# Patient Record
Sex: FEMALE | Race: BLACK OR AFRICAN AMERICAN | NOT HISPANIC OR LATINO | Employment: UNEMPLOYED | URBAN - METROPOLITAN AREA
[De-identification: names, ages, dates, MRNs, and addresses within clinical notes are randomized per-mention and may not be internally consistent; named-entity substitution may affect disease eponyms.]

---

## 2021-03-23 ENCOUNTER — HISTORICAL (OUTPATIENT)
Dept: ADMINISTRATIVE | Facility: HOSPITAL | Age: 51
End: 2021-03-23

## 2022-04-10 ENCOUNTER — HISTORICAL (OUTPATIENT)
Dept: ADMINISTRATIVE | Facility: HOSPITAL | Age: 52
End: 2022-04-10

## 2022-04-26 VITALS
WEIGHT: 259.94 LBS | HEIGHT: 63 IN | DIASTOLIC BLOOD PRESSURE: 85 MMHG | SYSTOLIC BLOOD PRESSURE: 126 MMHG | BODY MASS INDEX: 46.06 KG/M2

## 2022-11-08 ENCOUNTER — TELEPHONE (OUTPATIENT)
Dept: ORTHOPEDICS | Facility: CLINIC | Age: 52
End: 2022-11-08

## 2022-11-17 ENCOUNTER — OFFICE VISIT (OUTPATIENT)
Dept: ORTHOPEDICS | Facility: CLINIC | Age: 52
End: 2022-11-17
Payer: MEDICARE

## 2022-11-17 ENCOUNTER — HOSPITAL ENCOUNTER (OUTPATIENT)
Dept: RADIOLOGY | Facility: CLINIC | Age: 52
Discharge: HOME | End: 2022-11-17
Attending: ORTHOPAEDIC SURGERY
Payer: MEDICARE

## 2022-11-17 VITALS
HEIGHT: 62 IN | BODY MASS INDEX: 47.66 KG/M2 | SYSTOLIC BLOOD PRESSURE: 158 MMHG | HEART RATE: 79 BPM | DIASTOLIC BLOOD PRESSURE: 92 MMHG | WEIGHT: 259 LBS

## 2022-11-17 DIAGNOSIS — M17.11 PRIMARY OSTEOARTHRITIS OF RIGHT KNEE: Primary | ICD-10-CM

## 2022-11-17 DIAGNOSIS — E66.01 MORBID OBESITY: ICD-10-CM

## 2022-11-17 DIAGNOSIS — M25.561 ACUTE PAIN OF RIGHT KNEE: ICD-10-CM

## 2022-11-17 PROCEDURE — 3080F DIAST BP >= 90 MM HG: CPT | Mod: CPTII,,, | Performed by: ORTHOPAEDIC SURGERY

## 2022-11-17 PROCEDURE — 1160F PR REVIEW ALL MEDS BY PRESCRIBER/CLIN PHARMACIST DOCUMENTED: ICD-10-PCS | Mod: CPTII,,, | Performed by: ORTHOPAEDIC SURGERY

## 2022-11-17 PROCEDURE — 99214 PR OFFICE/OUTPT VISIT, EST, LEVL IV, 30-39 MIN: ICD-10-PCS | Mod: 25,,, | Performed by: ORTHOPAEDIC SURGERY

## 2022-11-17 PROCEDURE — 1159F PR MEDICATION LIST DOCUMENTED IN MEDICAL RECORD: ICD-10-PCS | Mod: CPTII,,, | Performed by: ORTHOPAEDIC SURGERY

## 2022-11-17 PROCEDURE — 20610 LARGE JOINT ASPIRATION/INJECTION: R KNEE: ICD-10-PCS | Mod: RT,,, | Performed by: ORTHOPAEDIC SURGERY

## 2022-11-17 PROCEDURE — 3008F PR BODY MASS INDEX (BMI) DOCUMENTED: ICD-10-PCS | Mod: CPTII,,, | Performed by: ORTHOPAEDIC SURGERY

## 2022-11-17 PROCEDURE — 1159F MED LIST DOCD IN RCRD: CPT | Mod: CPTII,,, | Performed by: ORTHOPAEDIC SURGERY

## 2022-11-17 PROCEDURE — 20610 DRAIN/INJ JOINT/BURSA W/O US: CPT | Mod: RT,,, | Performed by: ORTHOPAEDIC SURGERY

## 2022-11-17 PROCEDURE — 3080F PR MOST RECENT DIASTOLIC BLOOD PRESSURE >= 90 MM HG: ICD-10-PCS | Mod: CPTII,,, | Performed by: ORTHOPAEDIC SURGERY

## 2022-11-17 PROCEDURE — 3008F BODY MASS INDEX DOCD: CPT | Mod: CPTII,,, | Performed by: ORTHOPAEDIC SURGERY

## 2022-11-17 PROCEDURE — 73564 X-RAY EXAM KNEE 4 OR MORE: CPT | Mod: RT,,, | Performed by: ORTHOPAEDIC SURGERY

## 2022-11-17 PROCEDURE — 73564 XR KNEE COMP 4 OR MORE VIEWS RIGHT: ICD-10-PCS | Mod: RT,,, | Performed by: ORTHOPAEDIC SURGERY

## 2022-11-17 PROCEDURE — 3077F PR MOST RECENT SYSTOLIC BLOOD PRESSURE >= 140 MM HG: ICD-10-PCS | Mod: CPTII,,, | Performed by: ORTHOPAEDIC SURGERY

## 2022-11-17 PROCEDURE — 99214 OFFICE O/P EST MOD 30 MIN: CPT | Mod: 25,,, | Performed by: ORTHOPAEDIC SURGERY

## 2022-11-17 PROCEDURE — 3077F SYST BP >= 140 MM HG: CPT | Mod: CPTII,,, | Performed by: ORTHOPAEDIC SURGERY

## 2022-11-17 PROCEDURE — 1160F RVW MEDS BY RX/DR IN RCRD: CPT | Mod: CPTII,,, | Performed by: ORTHOPAEDIC SURGERY

## 2022-11-17 RX ORDER — QUETIAPINE FUMARATE 200 MG/1
TABLET, FILM COATED ORAL
COMMUNITY
Start: 2022-08-08

## 2022-11-17 RX ORDER — LINACLOTIDE 145 UG/1
145 CAPSULE, GELATIN COATED ORAL EVERY MORNING
COMMUNITY
Start: 2022-10-23

## 2022-11-17 RX ORDER — BENZTROPINE MESYLATE 1 MG/1
1 TABLET ORAL 2 TIMES DAILY
COMMUNITY
Start: 2022-10-17

## 2022-11-17 RX ORDER — LIDOCAINE HYDROCHLORIDE 20 MG/ML
2 INJECTION, SOLUTION INFILTRATION; PERINEURAL
Status: DISCONTINUED | OUTPATIENT
Start: 2022-11-17 | End: 2022-11-17 | Stop reason: HOSPADM

## 2022-11-17 RX ORDER — FUROSEMIDE 40 MG/1
40 TABLET ORAL
COMMUNITY

## 2022-11-17 RX ORDER — HYDROXYZINE HYDROCHLORIDE 50 MG/1
50 TABLET, FILM COATED ORAL
COMMUNITY
Start: 2022-09-07

## 2022-11-17 RX ORDER — IBUPROFEN 600 MG/1
600 TABLET ORAL EVERY 6 HOURS PRN
COMMUNITY
Start: 2022-05-31 | End: 2024-03-12

## 2022-11-17 RX ORDER — ACETAMINOPHEN AND CODEINE PHOSPHATE 300; 30 MG/1; MG/1
1 TABLET ORAL EVERY 6 HOURS PRN
COMMUNITY
Start: 2022-05-31 | End: 2024-03-12

## 2022-11-17 RX ORDER — DULOXETIN HYDROCHLORIDE 60 MG/1
60 CAPSULE, DELAYED RELEASE ORAL
COMMUNITY
End: 2024-03-11

## 2022-11-17 RX ORDER — ACETAMINOPHEN 500 MG
2000 TABLET ORAL DAILY
COMMUNITY
Start: 2022-08-18

## 2022-11-17 RX ORDER — POTASSIUM CHLORIDE 750 MG/1
10 TABLET, EXTENDED RELEASE ORAL
COMMUNITY
End: 2024-03-12

## 2022-11-17 RX ORDER — SERTRALINE HYDROCHLORIDE 100 MG/1
100 TABLET, FILM COATED ORAL
COMMUNITY
Start: 2022-09-07

## 2022-11-17 RX ORDER — BETAMETHASONE SODIUM PHOSPHATE AND BETAMETHASONE ACETATE 3; 3 MG/ML; MG/ML
6 INJECTION, SUSPENSION INTRA-ARTICULAR; INTRALESIONAL; INTRAMUSCULAR; SOFT TISSUE
Status: DISCONTINUED | OUTPATIENT
Start: 2022-11-17 | End: 2022-11-17 | Stop reason: HOSPADM

## 2022-11-17 RX ORDER — FLUTICASONE PROPIONATE 50 MCG
SPRAY, SUSPENSION (ML) NASAL
COMMUNITY
Start: 2022-09-07

## 2022-11-17 RX ORDER — TORSEMIDE 20 MG/1
20 TABLET ORAL
COMMUNITY
Start: 2022-09-07

## 2022-11-17 RX ORDER — SOD SULF/POT CHLORIDE/MAG SULF 1.479 G
TABLET ORAL
COMMUNITY
Start: 2022-09-25

## 2022-11-17 RX ORDER — OMEPRAZOLE 20 MG/1
20 CAPSULE, DELAYED RELEASE ORAL
COMMUNITY
End: 2024-03-12

## 2022-11-17 RX ADMIN — LIDOCAINE HYDROCHLORIDE 2 MG: 20 INJECTION, SOLUTION INFILTRATION; PERINEURAL at 02:11

## 2022-11-17 RX ADMIN — BETAMETHASONE SODIUM PHOSPHATE AND BETAMETHASONE ACETATE 6 MG: 3; 3 INJECTION, SUSPENSION INTRA-ARTICULAR; INTRALESIONAL; INTRAMUSCULAR; SOFT TISSUE at 02:11

## 2022-11-17 NOTE — PROGRESS NOTES
History of present illness:    This is a 52 y.o. year old female that present today with knee pain. Patient has had this knee pain for several months. This knee pain is moderate to severe. Patient states pain is worse with squats and lunges. Patient states pain is global.  She is been treated previously with right knee steroid injections.  She states that they were helpful.  She is currently taking ibuprofen and it is helpful as well.    History reviewed. No pertinent past medical history.    Past Surgical History:   Procedure Laterality Date     SECTION      TUBAL LIGATION         Current Outpatient Medications   Medication Sig    acetaminophen-codeine 300-30mg (TYLENOL #3) 300-30 mg Tab Take 1 tablet by mouth every 6 (six) hours as needed.    benztropine (COGENTIN) 1 MG tablet Take 1 mg by mouth 2 (two) times daily.    cholecalciferol, vitamin D3, (VITAMIN D3) 50 mcg (2,000 unit) Cap capsule Take 2,000 Units by mouth once daily.    DULoxetine (CYMBALTA) 60 MG capsule Take 60 mg by mouth.    fluticasone propionate (FLONASE) 50 mcg/actuation nasal spray by Nasal route.    furosemide (LASIX) 40 MG tablet Take 40 mg by mouth.    hydrOXYzine (ATARAX) 50 MG tablet Take 50 mg by mouth.    ibuprofen (ADVIL,MOTRIN) 600 MG tablet Take 600 mg by mouth every 6 (six) hours as needed.    LINZESS 145 mcg Cap capsule Take 145 mcg by mouth every morning.    omeprazole (PRILOSEC) 20 MG capsule Take 20 mg by mouth.    potassium chloride (KLOR-CON) 10 MEQ TbSR Take 10 mEq by mouth.    QUEtiapine (SEROQUEL) 200 MG Tab     sertraline (ZOLOFT) 100 MG tablet Take 100 mg by mouth.    torsemide (DEMADEX) 20 MG Tab Take 20 mg by mouth.    SUTAB 1.479-0.188- 0.225 gram tablet Take by mouth.     No current facility-administered medications for this visit.       Review of patient's allergies indicates:  Not on File    Family History   Problem Relation Age of Onset    No Known Problems Mother     No Known Problems Father     No Known  "Problems Sister        Social History     Socioeconomic History    Marital status: Single   Tobacco Use    Smoking status: Never    Smokeless tobacco: Never   Substance and Sexual Activity    Alcohol use: Yes     Comment: occasion    Drug use: Never    Sexual activity: Not Currently       Chief Complaint:   Chief Complaint   Patient presents with    Right Knee - Pain     Right knee pain. interested in synvisc. No piror injury or sx. intrested in an injection. Previously had cortisone injection and had relief.        Consulting Physician: No ref. provider found      Review of Systems:    All review of systems negative except for those stated in the HPI    Examination:    Vital Signs:    Vitals:    11/17/22 1542   BP: (!) 158/92   Pulse: 79   Weight: 117.5 kg (259 lb)   Height: 5' 2" (1.575 m)   PainSc:   7       Body mass index is 47.37 kg/m².    Physical Exam:   General: Well-developed, well-nourished.  Neuro: Alert and oriented x 3.  Psych: Normal mood and affect.    Right Knee Exam:  Varus deformity. Range of motion from 5-110 degrees. Negative patella grind and equal subluxation of knee cap medial and lateral < 1cm. Negative patella tendon tenderness. Negative Lachman and anterior drawer test. Negative posterior drawer test. Negative varus and valgus stress test. Positive medial joint line tenderness. Negative lateral joint line tenderness. 4/5 strength and normal skin appearance. Sensibility normal.      Imaging: X-rays ordered and images interpreted today personally by me of X-rays demonstrate views of the knee joint space narrowing and degenerative changes with a varus deformity. There is also subchondral sclerosis and osteophyte formation which is equivalent to a Kellgren Dean grade 4.         Assessment: Primary osteoarthritis of right knee  -     Large Joint Aspiration/Injection: R knee  -     Prior authorization Order    Acute pain of right knee  -     X-Ray Knee Complete 4 Or More Views Right; Future; " Expected date: 11/17/2022    Morbid obesity  -     Ambulatory referral/consult to Bariatric Surgery; Future; Expected date: 11/24/2022      Plan:    We had a long discussion about options.  I am going to do a steroid injection here today and bring her back in 2 weeks for a Synvisc injection.  I will also make a referral to a weight loss specialist.  I do think she will need knee replacement surgery in her near future.      Large Joint Aspiration/Injection: R knee    Date/Time: 11/17/2022 2:15 PM  Performed by: Jesus Corona MD  Authorized by: Jesus Corona MD     Consent Done?:  Yes (Verbal)  Indications:  Arthritis and pain  Timeout: prior to procedure the correct patient, procedure, and site was verified    Prep: patient was prepped and draped in usual sterile fashion      Details:  Needle Size:  25 G  Approach:  Anterolateral  Location:  Knee  Site:  R knee  Medications:  2 mg LIDOcaine HCL 20 mg/ml (2%) 20 mg/mL (2 %); 6 mg betamethasone acetate-betamethasone sodium phosphate 6 mg/mL  Patient tolerance:  Patient tolerated the procedure well with no immediate complications     DISCLAIMER: This note may have been dictated using voice recognition software and may contain grammatical errors.     NOTE: Consult report sent to referring provider via [employer] EMR.

## 2022-11-17 NOTE — PROCEDURES
Large Joint Aspiration/Injection: R knee    Date/Time: 11/17/2022 2:15 PM  Performed by: Jesus Corona MD  Authorized by: Jesus Corona MD     Consent Done?:  Yes (Verbal)  Indications:  Arthritis and pain  Timeout: prior to procedure the correct patient, procedure, and site was verified    Prep: patient was prepped and draped in usual sterile fashion      Details:  Needle Size:  25 G  Approach:  Anterolateral  Location:  Knee  Site:  R knee  Medications:  2 mg LIDOcaine HCL 20 mg/ml (2%) 20 mg/mL (2 %); 6 mg betamethasone acetate-betamethasone sodium phosphate 6 mg/mL  Patient tolerance:  Patient tolerated the procedure well with no immediate complications

## 2022-12-30 DIAGNOSIS — M25.561 ACUTE PAIN OF RIGHT KNEE: Primary | ICD-10-CM

## 2022-12-30 DIAGNOSIS — M17.11 PRIMARY OSTEOARTHRITIS OF RIGHT KNEE: ICD-10-CM

## 2023-01-17 ENCOUNTER — OFFICE VISIT (OUTPATIENT)
Dept: ORTHOPEDICS | Facility: CLINIC | Age: 53
End: 2023-01-17
Payer: MEDICARE

## 2023-01-17 VITALS — HEIGHT: 62 IN | WEIGHT: 259.06 LBS | BODY MASS INDEX: 47.67 KG/M2

## 2023-01-17 DIAGNOSIS — M17.11 PRIMARY OSTEOARTHRITIS OF RIGHT KNEE: Primary | ICD-10-CM

## 2023-01-17 PROCEDURE — 20610 DRAIN/INJ JOINT/BURSA W/O US: CPT | Mod: RT,,, | Performed by: ORTHOPAEDIC SURGERY

## 2023-01-17 PROCEDURE — 99214 PR OFFICE/OUTPT VISIT, EST, LEVL IV, 30-39 MIN: ICD-10-PCS | Mod: 25,,, | Performed by: ORTHOPAEDIC SURGERY

## 2023-01-17 PROCEDURE — 1160F PR REVIEW ALL MEDS BY PRESCRIBER/CLIN PHARMACIST DOCUMENTED: ICD-10-PCS | Mod: CPTII,,, | Performed by: ORTHOPAEDIC SURGERY

## 2023-01-17 PROCEDURE — 3008F PR BODY MASS INDEX (BMI) DOCUMENTED: ICD-10-PCS | Mod: CPTII,,, | Performed by: ORTHOPAEDIC SURGERY

## 2023-01-17 PROCEDURE — 1160F RVW MEDS BY RX/DR IN RCRD: CPT | Mod: CPTII,,, | Performed by: ORTHOPAEDIC SURGERY

## 2023-01-17 PROCEDURE — 20610 LARGE JOINT ASPIRATION/INJECTION: R KNEE: ICD-10-PCS | Mod: RT,,, | Performed by: ORTHOPAEDIC SURGERY

## 2023-01-17 PROCEDURE — 3008F BODY MASS INDEX DOCD: CPT | Mod: CPTII,,, | Performed by: ORTHOPAEDIC SURGERY

## 2023-01-17 PROCEDURE — 99214 OFFICE O/P EST MOD 30 MIN: CPT | Mod: 25,,, | Performed by: ORTHOPAEDIC SURGERY

## 2023-01-17 PROCEDURE — 1159F MED LIST DOCD IN RCRD: CPT | Mod: CPTII,,, | Performed by: ORTHOPAEDIC SURGERY

## 2023-01-17 PROCEDURE — 1159F PR MEDICATION LIST DOCUMENTED IN MEDICAL RECORD: ICD-10-PCS | Mod: CPTII,,, | Performed by: ORTHOPAEDIC SURGERY

## 2023-01-17 RX ORDER — LIDOCAINE HYDROCHLORIDE 20 MG/ML
2 INJECTION, SOLUTION INFILTRATION; PERINEURAL
Status: DISCONTINUED | OUTPATIENT
Start: 2023-01-17 | End: 2023-01-17 | Stop reason: HOSPADM

## 2023-01-17 RX ADMIN — LIDOCAINE HYDROCHLORIDE 2 MG: 20 INJECTION, SOLUTION INFILTRATION; PERINEURAL at 01:01

## 2023-01-17 NOTE — PROCEDURES
Large Joint Aspiration/Injection: R knee    Date/Time: 1/17/2023 1:30 PM  Performed by: Jesus Corona MD  Authorized by: Jesus Corona MD     Consent Done?:  Yes (Verbal)  Indications:  Pain and arthritis  Site marked: the procedure site was marked    Timeout: prior to procedure the correct patient, procedure, and site was verified    Prep: patient was prepped and draped in usual sterile fashion      Local anesthesia used?: Yes    Local anesthetic:  Topical anesthetic and lidocaine 2% without epinephrine    Details:  Needle Size:  18 G  Ultrasonic Guidance for needle placement?: No    Approach:  Superior (superolateral)  Location:  Knee  Site:  R knee  Medications:  48 mg hylan g-f 20 48 mg/6 mL; 2 mg LIDOcaine HCL 20 mg/ml (2%) 20 mg/mL (2 %)  Patient tolerance:  Patient tolerated the procedure well with no immediate complications

## 2023-01-17 NOTE — PROGRESS NOTES
History of present illness:    This is a 52 y.o. year old female that present today with knee pain. Patient has had this knee pain for several months. This knee pain is moderate to severe. Patient states pain is worse with squats and lunges. Patient states pain is global.  She is been treated previously with right knee steroid injections.  She states that they were helpful.  She is currently taking ibuprofen and it is helpful as well.  2023 this patient comes in today for a viscosupplementation injection.    History reviewed. No pertinent past medical history.    Past Surgical History:   Procedure Laterality Date     SECTION      TUBAL LIGATION         Current Outpatient Medications   Medication Sig    acetaminophen-codeine 300-30mg (TYLENOL #3) 300-30 mg Tab Take 1 tablet by mouth every 6 (six) hours as needed.    benztropine (COGENTIN) 1 MG tablet Take 1 mg by mouth 2 (two) times daily.    cholecalciferol, vitamin D3, (VITAMIN D3) 50 mcg (2,000 unit) Cap capsule Take 2,000 Units by mouth once daily.    DULoxetine (CYMBALTA) 60 MG capsule Take 60 mg by mouth.    fluticasone propionate (FLONASE) 50 mcg/actuation nasal spray by Nasal route.    furosemide (LASIX) 40 MG tablet Take 40 mg by mouth.    hydrOXYzine (ATARAX) 50 MG tablet Take 50 mg by mouth.    ibuprofen (ADVIL,MOTRIN) 600 MG tablet Take 600 mg by mouth every 6 (six) hours as needed.    LINZESS 145 mcg Cap capsule Take 145 mcg by mouth every morning.    omeprazole (PRILOSEC) 20 MG capsule Take 20 mg by mouth.    potassium chloride (KLOR-CON) 10 MEQ TbSR Take 10 mEq by mouth.    QUEtiapine (SEROQUEL) 200 MG Tab     sertraline (ZOLOFT) 100 MG tablet Take 100 mg by mouth.    SUTAB 1.479-0.188- 0.225 gram tablet Take by mouth.    torsemide (DEMADEX) 20 MG Tab Take 20 mg by mouth.     No current facility-administered medications for this visit.       Review of patient's allergies indicates:  No Known Allergies    Family History   Problem Relation  "Age of Onset    No Known Problems Mother     No Known Problems Father     No Known Problems Sister        Social History     Socioeconomic History    Marital status: Single   Tobacco Use    Smoking status: Never    Smokeless tobacco: Never   Substance and Sexual Activity    Alcohol use: Yes     Comment: occasion    Drug use: Never    Sexual activity: Not Currently       Chief Complaint:   Chief Complaint   Patient presents with    Right Knee - Pain    Pain     Right knee Synvisc    mn       Consulting Physician: Jesus Corona MD      Review of Systems:    All review of systems negative except for those stated in the HPI    Examination:    Vital Signs:    Vitals:    01/17/23 1356   Weight: 117.5 kg (259 lb 0.7 oz)   Height: 5' 2.01" (1.575 m)       Body mass index is 47.37 kg/m².    Physical Exam:   General: Well-developed, well-nourished.  Neuro: Alert and oriented x 3.  Psych: Normal mood and affect.    Right Knee Exam:  Varus deformity. Range of motion from 5-110 degrees. Negative patella grind and equal subluxation of knee cap medial and lateral < 1cm. Negative patella tendon tenderness. Negative Lachman and anterior drawer test. Negative posterior drawer test. Negative varus and valgus stress test. Positive medial joint line tenderness. Negative lateral joint line tenderness. 4/5 strength and normal skin appearance. Sensibility normal.      Imaging: X-rays ordered and images interpreted today personally by me of X-rays demonstrate views of the knee joint space narrowing and degenerative changes with a varus deformity. There is also subchondral sclerosis and osteophyte formation which is equivalent to a Kellgren Dean grade 4.         Assessment: Primary osteoarthritis of right knee  -     Large Joint Aspiration/Injection: R knee        Plan:    This patient underwent a Synvisc injection of her right knee today.  I will see her back in 4 months.          DISCLAIMER: This note may have been dictated using voice " recognition software and may contain grammatical errors.     NOTE: Consult report sent to referring provider via [employer] EMR.

## 2024-01-30 ENCOUNTER — TELEPHONE (OUTPATIENT)
Dept: SURGERY | Facility: CLINIC | Age: 54
End: 2024-01-30

## 2024-03-11 RX ORDER — HALOPERIDOL DECANOATE 100 MG/ML
INJECTION INTRAMUSCULAR
COMMUNITY

## 2024-03-11 RX ORDER — PANTOPRAZOLE SODIUM 40 MG/1
40 TABLET, DELAYED RELEASE ORAL EVERY MORNING
COMMUNITY
Start: 2024-01-18

## 2024-03-11 RX ORDER — METHIMAZOLE 10 MG/1
5 TABLET ORAL 3 TIMES DAILY
COMMUNITY

## 2024-03-11 NOTE — PROGRESS NOTES
"[facility] - General Surgery Services  [address] [place] [id number]  Phone: [phone]  Fax: [phone]    Initial Bariatric Consultation  Dr. Duane Carbajal  1970    Author: BRITTNI Millard      History of Present Illness:  Patient is a 53 y.o. female who is referred for evaluation of surgical treatment of morbid obesity. Her Body mass index is 49.26 kg/m². She has attended the bariatric seminar and is most interested in the sleeve gastrectomy procedure. The patient has had multiple unsuccessful diet attempts in the past without sustained weight loss. With multiple unsuccessful weight loss attempts, the patient believes they are ready for a bariatric surgical intervention.     Denies any anemia, bleeding or clotting disorders, easy bruisability, or previous thrombosis. No family history of clotting issues.     Ht: 64 in.  Weights:     Trend Weights BMI   Weight today at consult 287 #  Body mass index is 49.26 kg/m².       Estimated body mass index is 49.26 kg/m² as calculated from the following:    Height as of this encounter: 5' 4" (1.626 m).    Weight as of this encounter: 130.2 kg (287 lb).      Pertinent History:  HTN - [x] Yes   [] No  HLD - [x] Yes   [] No  DM  -  [x] Yes   [] No  SERINA - [] Yes   [x] No  GERD - [] Yes   [x] No (Controlled on PPI)  Anticoagulation- [] Yes   [x] No  Smoker- [] Yes   [x] No (former smoker)     Past medical history abstracted from previous medical records: history of chronic constipation, hypokalemia, generalized edema, visual hallucination, paranoid schizophrenia, bipolar affective disorder, pituitary microadenoma tumor. Last psych admission reported 24-36 mos ago (per pt).    Past Medical History:   Diagnosis Date    Abdominal bloating     Bipolar disorder, unspecified     Constipation     Diabetes mellitus     Hyperthyroidism     Hypokalemia     Personal history of colonic polyps     " Pituitary microadenoma      Past Surgical History:   Procedure Laterality Date     SECTION      TUBAL LIGATION       Family History   Problem Relation Age of Onset    No Known Problems Mother     No Known Problems Father     No Known Problems Sister      Social History     Tobacco Use    Smoking status: Never    Smokeless tobacco: Never   Substance Use Topics    Alcohol use: Yes     Comment: occasion    Drug use: Never          Review of patient's allergies indicates:  No Known Allergies    Current Outpatient Medications   Medication Sig Dispense Refill    acetaminophen-codeine 300-30mg (TYLENOL #3) 300-30 mg Tab Take 1 tablet by mouth every 6 (six) hours as needed.      benztropine (COGENTIN) 1 MG tablet Take 1 mg by mouth 2 (two) times daily.      cholecalciferol, vitamin D3, (VITAMIN D3) 50 mcg (2,000 unit) Cap capsule Take 2,000 Units by mouth once daily.      DULoxetine (CYMBALTA) 60 MG capsule Take 60 mg by mouth.      fluticasone propionate (FLONASE) 50 mcg/actuation nasal spray by Nasal route.      furosemide (LASIX) 40 MG tablet Take 40 mg by mouth.      hydrOXYzine (ATARAX) 50 MG tablet Take 50 mg by mouth.      ibuprofen (ADVIL,MOTRIN) 600 MG tablet Take 600 mg by mouth every 6 (six) hours as needed.      LINZESS 145 mcg Cap capsule Take 145 mcg by mouth every morning.      omeprazole (PRILOSEC) 20 MG capsule Take 20 mg by mouth.      potassium chloride (KLOR-CON) 10 MEQ TbSR Take 10 mEq by mouth.      QUEtiapine (SEROQUEL) 200 MG Tab       sertraline (ZOLOFT) 100 MG tablet Take 100 mg by mouth.      SUTAB 1.479-0.188- 0.225 gram tablet Take by mouth.      torsemide (DEMADEX) 20 MG Tab Take 20 mg by mouth.       No current facility-administered medications for this visit.       Patient Care Team:  No, Primary Doctor as PCP - General  Duane Villanueva MD as Surgeon (Bariatrics)  Jefferson Stevens MD as Consulting Physician (Gastroenterology)  Juan Baron MD (Cardiology)  Lalitha  "Nikki WELLS MD (Internal Medicine)  Dr. Thais Figueroa- Psych ([place])    Review of Systems:  Review of Systems   Constitutional: Negative.    HENT: Negative.     Eyes: Negative.    Respiratory: Negative.     Cardiovascular: Negative.    Gastrointestinal: Negative.    Endocrine: Negative.    Genitourinary: Negative.    Musculoskeletal: Negative.    Skin: Negative.    Allergic/Immunologic: Negative.    Neurological: Negative.    Psychiatric/Behavioral: Negative.     All other systems reviewed and are negative.      Physical:     Vitals:    03/12/24 1446   BP: 136/84   Pulse: 96   Temp: 98.6 °F (37 °C)   Weight: 130.2 kg (287 lb)   Height: 5' 4" (1.626 m)     Body mass index is 49.26 kg/m².    Physical Exam:  Physical Exam  Vitals reviewed.   Constitutional:       General: She is not in acute distress.     Appearance: Normal appearance. She is obese.   HENT:      Head: Normocephalic.   Eyes:      Conjunctiva/sclera: Conjunctivae normal.      Pupils: Pupils are equal, round, and reactive to light.   Cardiovascular:      Rate and Rhythm: Normal rate and regular rhythm.      Pulses: Normal pulses.      Heart sounds: Normal heart sounds.   Pulmonary:      Effort: Pulmonary effort is normal. No respiratory distress.      Breath sounds: Normal breath sounds.   Abdominal:      General: Abdomen is flat. Bowel sounds are normal.      Palpations: Abdomen is soft.      Tenderness: There is no abdominal tenderness.   Musculoskeletal:         General: Normal range of motion.      Cervical back: Neck supple.   Skin:     General: Skin is warm and dry.   Neurological:      General: No focal deficit present.      Mental Status: She is alert and oriented to person, place, and time.   Psychiatric:         Mood and Affect: Mood normal.         Behavior: Behavior normal.         ASSESSMENT/PLAN:        1. Morbid obesity with BMI of 45.0-49.9, adult            Plan:  Franci Carbajal has morbid obesity as their Body mass index is " 49.26 kg/m². She would benefit from weight loss surgery and has chosen Laparoscopic sleeve gastrectomy as the preferred procedure. She understands that this is a tool and lifestyle change will be necessary to keep weight off. Dr. Duane Villanueva discussed possible complications of all surgeries with the patient and she is agreeable to surgery. Patient informed that sleeve gastrectomy may result in symptoms of worsening reflux, possibly requiring long term medication, or additional surgical intervention in the future (including but not limited to conversion to Guillermo-en-Y gastric bypass). Future surgical intervention may/may not be covered by patient's insurance. Patient accepts this risk and is willing to proceed with workup for sleeve gastrectomy procedure.     RECOMMENDATION: Weight loss surgery. The risks, benefits of bariatric surgery, and non-surgical alternatives were discussed at length and all  questions were answered. The patient verbalizes understanding and wishes to proceed.     - Order H Pylori serology  - Schedule EGD upper endoscopy  - Refer to bariatric department for pre op education, nutritional counseling.   - Refer for pre op psychological evaluation. Pre op psych clearance and pre op cardiology clearance.  - Dr. Villanueva examined patient, discussed recommendations, and answered all questions.     Patient interested in restarting Ozempic. Offered referral to bariatrician for non surgical weight loss while preparing for bariatric surgery.     BRITTNI Millard    I, BRITTNI Francis, am scribing for, and in the presence of, Duane Villanueva MD, performed the above scribed service and the documentation accurately describes the services I performed. I attest to the accuracy of the note.

## 2024-03-12 ENCOUNTER — OFFICE VISIT (OUTPATIENT)
Dept: SURGERY | Facility: CLINIC | Age: 54
End: 2024-03-12
Payer: MEDICARE

## 2024-03-12 ENCOUNTER — CLINICAL SUPPORT (OUTPATIENT)
Dept: BARIATRICS | Facility: HOSPITAL | Age: 54
End: 2024-03-12

## 2024-03-12 VITALS
HEIGHT: 64 IN | WEIGHT: 287 LBS | SYSTOLIC BLOOD PRESSURE: 136 MMHG | TEMPERATURE: 99 F | BODY MASS INDEX: 49 KG/M2 | HEART RATE: 96 BPM | DIASTOLIC BLOOD PRESSURE: 84 MMHG

## 2024-03-12 DIAGNOSIS — E66.9 OBESITY: Primary | ICD-10-CM

## 2024-03-12 DIAGNOSIS — E66.01 MORBID OBESITY WITH BMI OF 45.0-49.9, ADULT: Primary | ICD-10-CM

## 2024-03-12 DIAGNOSIS — E66.9 OBESITY, UNSPECIFIED CLASSIFICATION, UNSPECIFIED OBESITY TYPE, UNSPECIFIED WHETHER SERIOUS COMORBIDITY PRESENT: Primary | ICD-10-CM

## 2024-03-12 PROCEDURE — 99205 OFFICE O/P NEW HI 60 MIN: CPT | Mod: ,,, | Performed by: SURGERY

## 2024-03-12 PROCEDURE — 3079F DIAST BP 80-89 MM HG: CPT | Mod: CPTII,,, | Performed by: SURGERY

## 2024-03-12 PROCEDURE — 3075F SYST BP GE 130 - 139MM HG: CPT | Mod: CPTII,,, | Performed by: SURGERY

## 2024-03-12 PROCEDURE — 3008F BODY MASS INDEX DOCD: CPT | Mod: CPTII,,, | Performed by: SURGERY

## 2024-03-12 PROCEDURE — 1159F MED LIST DOCD IN RCRD: CPT | Mod: CPTII,,, | Performed by: SURGERY

## 2024-03-12 NOTE — PROGRESS NOTES
"NUTRITIONAL CONSULT    Initial assessment for sleeve gastrectomy  Non Surgical: []    PAST HISTORY:   Dieting attempts include Self reported diet  and Diet supplements/ medication Ozempic  Highest adult weight: 295#  How many years at present wt:   Greatest single wt loss: 45#    CLINICAL DATA:  Height:   Ht Readings from Last 1 Encounters:   24 5' 4" (1.626 m)      Weight:   Wt Readings from Last 3 Encounters:   24 1446 130.2 kg (287 lb)   23 1356 117.5 kg (259 lb 0.7 oz)   22 1542 117.5 kg (259 lb)      IBW: 120 lbs   BMI:   BMI Readings from Last 1 Encounters:   24 49.26 kg/m²      Bariatric goal weight (125% EBW): 164 lbs  Patient's goal weight: 145 lbs    FAMILY HISTORY OF OBESITY:  Yes           WHAT SIDE OF THE FAMILY?   []Mother   []Father   []Sibling   []Child     []Extended    []Adopted       Goal for Bariatric Surgery: to improve health, to improve quality of life, to lose weight, and to prevent future medical conditions    NUTRITION & HEALTH HISTORY:  Greatest challenge: starchy CHO, portion control, snacking at night, and irregular meal patterns    Current diet recall:  10pm work- 7am    goes to bed at 11am- 6pm    B: scrambled eggs and toast  Cheese cake   L: gaby with boudin -  D: pork roast and turnip     Current Dietary Patterns:  Meal pattern: 2  Snackin / day Type:  Vegetables: Likes a variety. Eats almost daily.  Fruits: Likes a variety. Eats almost daily.  Beverages: water, soda, and diet tea  Alcohol consumption: Weekly. Type: wine   Dining out: Daily. Mostly fast food, restaurants, and take-out.  Grocery shopping and food prep: No[]Self   []Spouse   [x]Other:  mother  Emotional eating: Yes Which emotions if yes: none   Nighttime snacking: Yes Middle of night: No Before bedtime: Yes cookie   Hx of disordered eating behaviors: Yes Anorexia: Yes Bulimia: Yes Purging: Yes Binging:Yes  History of vitamin/mineral deficiencies:   No    Vitamins / Minerals / Herbs: "   Biotin, b complex     Food Allergies:   None       ASSESSMENT:  Patient reports attempts at weight loss, only to regain lost weight.  Patient demonstrated knowledge of healthy eating behaviors and exercise patterns; admits to not eating healthy and not exercising at this point.  Patient states willingness to change lifestyle and make behavior modifications.  Expect good  compliance after surgery at this time.        ESTIMATED NEEDS:  Calories: 4836-6263 (20-25 kcal/kg adjusted BW/d)  Protein: 74-82 (1.0-1.1 g/kg adjusted BW/d)  Fluid:  1480 (20 mL/kg actual BW/d)    BARIATRIC DIET DISCUSSION:  Discussed diet after surgery and related to patients food record.  Reviewed diet progression before and after surgery.  Reinforced that surgery is not a magic bullet and importance of low fat foods and no snacking.  Answered all questions.    RECOMMENDATIONS:  Patient is a good candidate for bariatric surgery.      PLAN:  3 meals per day   Protein every meal   Limit starches   Limit soda increase water

## 2024-03-12 NOTE — PROGRESS NOTES
BEHAVIOR MODIFICATION AND EXERCISE CONSULT    PERSONAL:     What initiated your interest in bariatric surgery?  My health, trouble moving around, SOB    Marital Status: [x]Single   []   []   []      Do you have children? 2    What is your highest level of education completed? bachelors    Who is your social or relational support? family    Do you work? 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 yes    Were there any particular events that lead to significant weight gain? []Loss of a loved one  []Pregnancy  []Trauma, accident, illness  []Loss of employment [x]Other    PHYSICAL ACTIVITY:    Do you currently exercise: []Yes   [x]No    If so, please describe:    Have you experienced any injuries and/or restrictions that may limit your physical activity? [x]Yes   []No    If so, please describe:     BEHAVIORS:    What behavior(s) would you like to change in order to be healthy? Exercise, eat healthier    On a scale of 1-10 (1-extremely low, 10-extremely high), how motivated are you to change your behavior(s)?    Do you currently use any type of tobacco products (vape, dip, cigarettes, etc.) No    If yes, on average, how many and/or how often do you use these products on a daily basis?    How many hours of sleep do you average? 8-9    Rate your stress level on a scale of 1-10 (1-extremely low, 10-extremely high) 5    What is your biggest stressor? weight    Is your appetite affected by stress, boredom, depression, etc.? boredom    How do you cope and/or manage stress?     Have you ever seen a counselor or therapist? Yes, psychiatrist (anxiety/depression)      CURRENT WEIGHT: 287.5  HIGHEST WEIGHT: 295   LOWEST ADULT WEIGHT: 135    WAIST/HIP: 55/55    HANDOUTS: Emotional connections to food.    NOTES:A body composition was conducted and patient was educated on results.       Mariposa Meléndez, MARK, CPT, CHC